# Patient Record
Sex: FEMALE | Race: WHITE | NOT HISPANIC OR LATINO | URBAN - METROPOLITAN AREA
[De-identification: names, ages, dates, MRNs, and addresses within clinical notes are randomized per-mention and may not be internally consistent; named-entity substitution may affect disease eponyms.]

---

## 2025-01-13 ENCOUNTER — OFFICE VISIT (OUTPATIENT)
Dept: URGENT CARE | Facility: CLINIC | Age: 55
End: 2025-01-13
Payer: COMMERCIAL

## 2025-01-13 VITALS
DIASTOLIC BLOOD PRESSURE: 63 MMHG | BODY MASS INDEX: 25.92 KG/M2 | RESPIRATION RATE: 18 BRPM | SYSTOLIC BLOOD PRESSURE: 137 MMHG | WEIGHT: 175 LBS | OXYGEN SATURATION: 98 % | HEIGHT: 69 IN | TEMPERATURE: 98.5 F | HEART RATE: 88 BPM

## 2025-01-13 DIAGNOSIS — R39.9 UTI SYMPTOMS: Primary | ICD-10-CM

## 2025-01-13 LAB
SL AMB  POCT GLUCOSE, UA: ABNORMAL
SL AMB LEUKOCYTE ESTERASE,UA: ABNORMAL
SL AMB POCT BILIRUBIN,UA: ABNORMAL
SL AMB POCT BLOOD,UA: ABNORMAL
SL AMB POCT CLARITY,UA: ABNORMAL
SL AMB POCT COLOR,UA: ABNORMAL
SL AMB POCT KETONES,UA: ABNORMAL
SL AMB POCT NITRITE,UA: ABNORMAL
SL AMB POCT PH,UA: 6
SL AMB POCT SPECIFIC GRAVITY,UA: 1.02
SL AMB POCT URINE PROTEIN: 300
SL AMB POCT UROBILINOGEN: 0.2

## 2025-01-13 PROCEDURE — 99203 OFFICE O/P NEW LOW 30 MIN: CPT | Performed by: FAMILY MEDICINE

## 2025-01-13 PROCEDURE — 87086 URINE CULTURE/COLONY COUNT: CPT | Performed by: FAMILY MEDICINE

## 2025-01-13 PROCEDURE — 81002 URINALYSIS NONAUTO W/O SCOPE: CPT | Performed by: FAMILY MEDICINE

## 2025-01-13 RX ORDER — NITROFURANTOIN 25; 75 MG/1; MG/1
100 CAPSULE ORAL 2 TIMES DAILY
Qty: 10 CAPSULE | Refills: 0 | Status: SHIPPED | OUTPATIENT
Start: 2025-01-13 | End: 2025-01-18

## 2025-01-13 NOTE — PATIENT INSTRUCTIONS
- urine dip test performed in office today shows positive blood and leukocytes   - urine sample sent for culture testing, patient has been instructed to call the office in 2-3 days to follow up culture results.   - Macrobid x 5 days prescribed, complete as directed   - may take AZO as needed   - may take Tylenol or Motrin as needed for pain   - patient is to rest and drink plenty of fluids  - follow up w/ PCP office for re-check in 3-5 days   - if symptoms persist despite treatment, worsen, or any new symptoms present, patient is to be seen in the ER

## 2025-01-13 NOTE — PROGRESS NOTES
[facility]        NAME: Liz Newton is a 54 y.o. female  : 1970    MRN: [id number]  DATE: 2025  TIME: 5:57 PM    Assessment and Plan   UTI symptoms [R39.9]  1. UTI symptoms  POCT urine dip    Urine culture    Urine culture    nitrofurantoin (MACROBID) 100 mg capsule        Patient Instructions     Patient Instructions   - urine dip test performed in office today shows positive blood and leukocytes   - urine sample sent for culture testing, patient has been instructed to call the office in 2-3 days to follow up culture results.   - Macrobid x 5 days prescribed, complete as directed   - may take AZO as needed   - may take Tylenol or Motrin as needed for pain   - patient is to rest and drink plenty of fluids  - follow up w/ PCP office for re-check in 3-5 days   - if symptoms persist despite treatment, worsen, or any new symptoms present, patient is to be seen in the ER      Follow up with PCP in 3-5 days.  Proceed to  ER if symptoms worsen.    If tests have been performed at [facility], our office will contact you with results if changes need to be made to the care plan discussed with you at the visit.  [facility]    Chief Complaint     Chief Complaint   Patient presents with    Possible UTI     Burning frequency urgency started last night.      History of Present Illness     53 yo female presents for a possible UTI. She is experiencing suprapubic pressure, dysuria, and urinary urgency and frequency. Her symptoms began yesterday. She also notes seeing slight blood in the urine. No vaginal bleeding or discharge. No vaginal itching or burning. No nausea/vomiting or diarrhea. No abdominal or pelvic pain. No flank pain. No fever/chills. She has not attempted any treatment for the current symptoms. Patient has a known antibiotic allergy to Ciprofloxacin. Urine dip performed in office today is positive for blood and leukocytes.      Review of Systems  "  Review of Systems   Constitutional: Negative.    Respiratory: Negative.     Cardiovascular: Negative.    Gastrointestinal: Negative.    Genitourinary:         As noted in HPI   Musculoskeletal: Negative.    Skin: Negative.    Allergic/Immunologic:        Ciprofloxacin (rash)   Neurological: Negative.    Hematological: Negative.      Current Medications       Current Outpatient Medications:     nitrofurantoin (MACROBID) 100 mg capsule, Take 1 capsule (100 mg total) by mouth 2 (two) times a day for 5 days, Disp: 10 capsule, Rfl: 0    Current Allergies     Allergies as of 01/13/2025 - Reviewed 01/13/2025   Allergen Reaction Noted    Ciprofloxacin Rash 01/13/2025            The following portions of the patient's history were reviewed and updated as appropriate: allergies, current medications, past family history, past medical history, past social history, past surgical history and problem list.     History reviewed. No pertinent past medical history.    Past Surgical History:   Procedure Laterality Date    BACK SURGERY         History reviewed. No pertinent family history.      Medications have been verified.        Objective   /63   Pulse 88   Temp 98.5 °F (36.9 °C)   Resp 18   Ht 5' 9.25\" (1.759 m)   Wt 79.4 kg (175 lb)   SpO2 98%   BMI 25.66 kg/m²   No LMP recorded.       Physical Exam     Physical Exam  Vitals and nursing note reviewed.   Constitutional:       General: She is awake. She is not in acute distress.     Appearance: Normal appearance. She is well-developed and well-groomed. She is not ill-appearing, toxic-appearing or diaphoretic.   Cardiovascular:      Rate and Rhythm: Normal rate and regular rhythm.      Pulses: Normal pulses.      Heart sounds: Normal heart sounds.   Pulmonary:      Effort: Pulmonary effort is normal. No tachypnea, accessory muscle usage or respiratory distress.      Breath sounds: Normal breath sounds and air entry.   Abdominal:      General: Abdomen is flat. Bowel " sounds are normal. There is no distension.      Palpations: Abdomen is soft. There is no mass.      Tenderness: There is no abdominal tenderness. There is no right CVA tenderness, left CVA tenderness, guarding or rebound.      Hernia: No hernia is present.   Skin:     General: Skin is warm and dry.      Capillary Refill: Capillary refill takes less than 2 seconds.      Coloration: Skin is not pale.   Neurological:      Mental Status: She is alert and oriented to person, place, and time. Mental status is at baseline.   Psychiatric:         Mood and Affect: Mood normal.         Behavior: Behavior normal. Behavior is cooperative.         Thought Content: Thought content normal.         Judgment: Judgment normal.

## 2025-01-14 LAB — BACTERIA UR CULT: NORMAL
